# Patient Record
Sex: FEMALE | Race: BLACK OR AFRICAN AMERICAN | NOT HISPANIC OR LATINO | URBAN - METROPOLITAN AREA
[De-identification: names, ages, dates, MRNs, and addresses within clinical notes are randomized per-mention and may not be internally consistent; named-entity substitution may affect disease eponyms.]

---

## 2018-04-29 ENCOUNTER — INPATIENT (INPATIENT)
Facility: HOSPITAL | Age: 68
LOS: 3 days | Discharge: HOME HEALTH | End: 2018-05-03
Attending: INTERNAL MEDICINE | Admitting: INTERNAL MEDICINE
Payer: MEDICARE

## 2018-04-29 VITALS
SYSTOLIC BLOOD PRESSURE: 195 MMHG | OXYGEN SATURATION: 100 % | RESPIRATION RATE: 18 BRPM | DIASTOLIC BLOOD PRESSURE: 92 MMHG | HEART RATE: 100 BPM | TEMPERATURE: 99 F

## 2018-04-29 RX ORDER — OXYCODONE HYDROCHLORIDE 5 MG/1
5 TABLET ORAL ONCE
Qty: 0 | Refills: 0 | Status: DISCONTINUED | OUTPATIENT
Start: 2018-04-29 | End: 2018-04-29

## 2018-04-29 RX ORDER — ACETAMINOPHEN 500 MG
650 TABLET ORAL ONCE
Qty: 0 | Refills: 0 | Status: COMPLETED | OUTPATIENT
Start: 2018-04-29 | End: 2018-04-29

## 2018-04-29 RX ORDER — IBUPROFEN 200 MG
600 TABLET ORAL ONCE
Qty: 0 | Refills: 0 | Status: COMPLETED | OUTPATIENT
Start: 2018-04-29 | End: 2018-04-29

## 2018-04-29 RX ADMIN — Medication 650 MILLIGRAM(S): at 22:08

## 2018-04-29 RX ADMIN — Medication 600 MILLIGRAM(S): at 23:08

## 2018-04-29 RX ADMIN — Medication 600 MILLIGRAM(S): at 22:08

## 2018-04-29 NOTE — ED PROVIDER NOTE - PROGRESS NOTE DETAILS
[place]: Pt signed out to me.  59F s/p mechanical fall, now with L hamstring pain.  Pending XR L femur and hip. [place]: Imaging neg for fx or dislocation but patient still with significant hamstring pain.  Unable to ambulate.  Will give additional pain medication and reeval. [place]: Pt still unable to ambulate.  Will obtain CT lumbar spine and L hip. [place]: Spoke with radiology, who does not see a fracture but does see inflammation around the origin of the hamstring.  Pt still cannot ambulate.  Will admit for pain control and PT.

## 2018-04-29 NOTE — ED ADULT NURSE NOTE - OBJECTIVE STATEMENT
Alert and oriented x 4. Pt received to spot 13 due to slip and fall. Pt states : " I was trying to avoid a puddle and fell on my butt." Pt complaining of pain to left leg and butt. Positive pulse, motor and sensory to left extremity. Pt denies chest pain, shortness of breath, nausea, vomiting or dizziness. No painful urination or diarrhea. Family at bedside. VSS. Will continue to monitor. RN Facilitator.

## 2018-04-29 NOTE — ED PROVIDER NOTE - ATTENDING CONTRIBUTION TO CARE
I was physically present for the E/M service provided. I agree with above history, physical, and plan which I have reviewed and edited where appropriate. I was physically present for the key portions of the service provided.    67 yr old female with hx of HTn not on ac presents to ed with mechanical fall onto left buttock while attempting to avoid a puddle outside her house.  pt denies any preceding sx.  slipped on dirt and landed on left buttock, no head trauma, no loc, able to get up but in severe and spastic pain at left hamstring.  no numbness or tingling.    *GEN:   uncomfortable, in mild apparent distress, AOx3  *CV:   regular rate and rhythm, normal S1/S2, no murmur  *RESP:   clear to auscultation bilaterally, non-labored, speaking in full sentences  *ABD:   soft, non tender, no guarding  *:   no cva tenderness  *MSK:   severe spasm of left hamstrings, moving all extremity, no obvious deformity or hematoma  *SKIN:   dry, intact, no rash  *NEURO:   AOx3, no focal weakness or loss of sensation,  GCS 15    pt with mechanical fall now having severe spasms will give heat packs, valium, and obtain xr to r/o fractures (even though very unlikely) more concern for partial tear of muscle vs severe contusion.

## 2018-04-29 NOTE — ED PROVIDER NOTE - OBJECTIVE STATEMENT
68 yo F with htn presenting after mechanical fall while walking home. Pt slipped and fell on her left buttocks. complains of severe pain on the left gluteal muscle. Pain is worse with movements and tenderness. Denies fevers, chills, cough, rhinorrhea, otorrhea, otalgia, nausea, vomiting, constipation, diarrhea, chest pain, shortness of breath or changes in urinary habits.

## 2018-04-29 NOTE — ED ADULT TRIAGE NOTE - CHIEF COMPLAINT QUOTE
Brought in by DANA from home for mechanical fall.  Patient was walking down some stairs and there was a puddle of water she tried to step around and fall.  Denies dizziness or weakness.  No LOC or trauma to head.  Complaining of pain to left gluteal and left leg.  Patient unable to extend left leg.

## 2018-04-30 DIAGNOSIS — Z29.9 ENCOUNTER FOR PROPHYLACTIC MEASURES, UNSPECIFIED: ICD-10-CM

## 2018-04-30 DIAGNOSIS — M79.605 PAIN IN LEFT LEG: ICD-10-CM

## 2018-04-30 DIAGNOSIS — S76.319A STRAIN OF MUSCLE, FASCIA AND TENDON OF THE POSTERIOR MUSCLE GROUP AT THIGH LEVEL, UNSPECIFIED THIGH, INITIAL ENCOUNTER: ICD-10-CM

## 2018-04-30 DIAGNOSIS — I10 ESSENTIAL (PRIMARY) HYPERTENSION: ICD-10-CM

## 2018-04-30 DIAGNOSIS — I45.6 PRE-EXCITATION SYNDROME: ICD-10-CM

## 2018-04-30 LAB
ALBUMIN SERPL ELPH-MCNC: 3.7 G/DL — SIGNIFICANT CHANGE UP (ref 3.3–5)
ALP SERPL-CCNC: 62 U/L — SIGNIFICANT CHANGE UP (ref 40–120)
ALT FLD-CCNC: 13 U/L — SIGNIFICANT CHANGE UP (ref 4–33)
AST SERPL-CCNC: 19 U/L — SIGNIFICANT CHANGE UP (ref 4–32)
BASOPHILS # BLD AUTO: 0.05 K/UL — SIGNIFICANT CHANGE UP (ref 0–0.2)
BASOPHILS NFR BLD AUTO: 0.4 % — SIGNIFICANT CHANGE UP (ref 0–2)
BILIRUB SERPL-MCNC: 0.9 MG/DL — SIGNIFICANT CHANGE UP (ref 0.2–1.2)
BUN SERPL-MCNC: 14 MG/DL — SIGNIFICANT CHANGE UP (ref 7–23)
CALCIUM SERPL-MCNC: 8.4 MG/DL — SIGNIFICANT CHANGE UP (ref 8.4–10.5)
CHLORIDE SERPL-SCNC: 98 MMOL/L — SIGNIFICANT CHANGE UP (ref 98–107)
CK SERPL-CCNC: 332 U/L — HIGH (ref 25–170)
CO2 SERPL-SCNC: 27 MMOL/L — SIGNIFICANT CHANGE UP (ref 22–31)
CREAT SERPL-MCNC: 1.17 MG/DL — SIGNIFICANT CHANGE UP (ref 0.5–1.3)
EOSINOPHIL # BLD AUTO: 0.01 K/UL — SIGNIFICANT CHANGE UP (ref 0–0.5)
EOSINOPHIL NFR BLD AUTO: 0.1 % — SIGNIFICANT CHANGE UP (ref 0–6)
GLUCOSE SERPL-MCNC: 115 MG/DL — HIGH (ref 70–99)
HCT VFR BLD CALC: 35.7 % — SIGNIFICANT CHANGE UP (ref 34.5–45)
HGB BLD-MCNC: 11.4 G/DL — LOW (ref 11.5–15.5)
IMM GRANULOCYTES # BLD AUTO: 0.04 # — SIGNIFICANT CHANGE UP
IMM GRANULOCYTES NFR BLD AUTO: 0.3 % — SIGNIFICANT CHANGE UP (ref 0–1.5)
LYMPHOCYTES # BLD AUTO: 15.9 % — SIGNIFICANT CHANGE UP (ref 13–44)
LYMPHOCYTES # BLD AUTO: 2.08 K/UL — SIGNIFICANT CHANGE UP (ref 1–3.3)
MAGNESIUM SERPL-MCNC: 1.9 MG/DL — SIGNIFICANT CHANGE UP (ref 1.6–2.6)
MCHC RBC-ENTMCNC: 29.4 PG — SIGNIFICANT CHANGE UP (ref 27–34)
MCHC RBC-ENTMCNC: 31.9 % — LOW (ref 32–36)
MCV RBC AUTO: 92 FL — SIGNIFICANT CHANGE UP (ref 80–100)
MONOCYTES # BLD AUTO: 1.2 K/UL — HIGH (ref 0–0.9)
MONOCYTES NFR BLD AUTO: 9.2 % — SIGNIFICANT CHANGE UP (ref 2–14)
NEUTROPHILS # BLD AUTO: 9.72 K/UL — HIGH (ref 1.8–7.4)
NEUTROPHILS NFR BLD AUTO: 74.1 % — SIGNIFICANT CHANGE UP (ref 43–77)
NRBC # FLD: 0 — SIGNIFICANT CHANGE UP
PHOSPHATE SERPL-MCNC: 2.8 MG/DL — SIGNIFICANT CHANGE UP (ref 2.5–4.5)
PLATELET # BLD AUTO: 360 K/UL — SIGNIFICANT CHANGE UP (ref 150–400)
PMV BLD: 11.3 FL — SIGNIFICANT CHANGE UP (ref 7–13)
POTASSIUM SERPL-MCNC: 3.2 MMOL/L — LOW (ref 3.5–5.3)
POTASSIUM SERPL-SCNC: 3.2 MMOL/L — LOW (ref 3.5–5.3)
PROT SERPL-MCNC: 7.2 G/DL — SIGNIFICANT CHANGE UP (ref 6–8.3)
RBC # BLD: 3.88 M/UL — SIGNIFICANT CHANGE UP (ref 3.8–5.2)
RBC # FLD: 13.3 % — SIGNIFICANT CHANGE UP (ref 10.3–14.5)
SODIUM SERPL-SCNC: 138 MMOL/L — SIGNIFICANT CHANGE UP (ref 135–145)
WBC # BLD: 13.1 K/UL — HIGH (ref 3.8–10.5)
WBC # FLD AUTO: 13.1 K/UL — HIGH (ref 3.8–10.5)

## 2018-04-30 PROCEDURE — 72131 CT LUMBAR SPINE W/O DYE: CPT | Mod: 26

## 2018-04-30 PROCEDURE — 73501 X-RAY EXAM HIP UNI 1 VIEW: CPT | Mod: 26,LT

## 2018-04-30 PROCEDURE — 73700 CT LOWER EXTREMITY W/O DYE: CPT | Mod: 26,LT

## 2018-04-30 PROCEDURE — 93010 ELECTROCARDIOGRAM REPORT: CPT

## 2018-04-30 PROCEDURE — 99223 1ST HOSP IP/OBS HIGH 75: CPT

## 2018-04-30 RX ORDER — OXYCODONE AND ACETAMINOPHEN 5; 325 MG/1; MG/1
1 TABLET ORAL EVERY 6 HOURS
Qty: 0 | Refills: 0 | Status: DISCONTINUED | OUTPATIENT
Start: 2018-04-30 | End: 2018-05-03

## 2018-04-30 RX ORDER — MORPHINE SULFATE 50 MG/1
4 CAPSULE, EXTENDED RELEASE ORAL ONCE
Qty: 0 | Refills: 0 | Status: DISCONTINUED | OUTPATIENT
Start: 2018-04-30 | End: 2018-04-30

## 2018-04-30 RX ORDER — HEPARIN SODIUM 5000 [USP'U]/ML
5000 INJECTION INTRAVENOUS; SUBCUTANEOUS EVERY 12 HOURS
Qty: 0 | Refills: 0 | Status: DISCONTINUED | OUTPATIENT
Start: 2018-04-30 | End: 2018-05-03

## 2018-04-30 RX ORDER — POTASSIUM CHLORIDE 20 MEQ
40 PACKET (EA) ORAL ONCE
Qty: 0 | Refills: 0 | Status: COMPLETED | OUTPATIENT
Start: 2018-04-30 | End: 2018-04-30

## 2018-04-30 RX ORDER — ONDANSETRON 8 MG/1
4 TABLET, FILM COATED ORAL EVERY 6 HOURS
Qty: 0 | Refills: 0 | Status: DISCONTINUED | OUTPATIENT
Start: 2018-04-30 | End: 2018-05-03

## 2018-04-30 RX ORDER — HYDROCHLOROTHIAZIDE 25 MG
25 TABLET ORAL DAILY
Qty: 0 | Refills: 0 | Status: DISCONTINUED | OUTPATIENT
Start: 2018-04-30 | End: 2018-05-03

## 2018-04-30 RX ORDER — LOSARTAN POTASSIUM 100 MG/1
100 TABLET, FILM COATED ORAL DAILY
Qty: 0 | Refills: 0 | Status: DISCONTINUED | OUTPATIENT
Start: 2018-04-30 | End: 2018-05-03

## 2018-04-30 RX ORDER — OXYCODONE AND ACETAMINOPHEN 5; 325 MG/1; MG/1
2 TABLET ORAL EVERY 6 HOURS
Qty: 0 | Refills: 0 | Status: DISCONTINUED | OUTPATIENT
Start: 2018-04-30 | End: 2018-05-03

## 2018-04-30 RX ORDER — METOPROLOL TARTRATE 50 MG
200 TABLET ORAL DAILY
Qty: 0 | Refills: 0 | Status: DISCONTINUED | OUTPATIENT
Start: 2018-04-30 | End: 2018-05-03

## 2018-04-30 RX ORDER — METOPROLOL TARTRATE 50 MG
1 TABLET ORAL
Qty: 0 | Refills: 0 | COMMUNITY

## 2018-04-30 RX ORDER — POLYETHYLENE GLYCOL 3350 17 G/17G
17 POWDER, FOR SOLUTION ORAL DAILY
Qty: 0 | Refills: 0 | Status: DISCONTINUED | OUTPATIENT
Start: 2018-04-30 | End: 2018-05-03

## 2018-04-30 RX ORDER — OXYCODONE HYDROCHLORIDE 5 MG/1
10 TABLET ORAL ONCE
Qty: 0 | Refills: 0 | Status: DISCONTINUED | OUTPATIENT
Start: 2018-04-30 | End: 2018-04-30

## 2018-04-30 RX ORDER — LOSARTAN/HYDROCHLOROTHIAZIDE 100MG-25MG
1 TABLET ORAL
Qty: 0 | Refills: 0 | COMMUNITY

## 2018-04-30 RX ADMIN — Medication 25 MILLIGRAM(S): at 11:03

## 2018-04-30 RX ADMIN — MORPHINE SULFATE 4 MILLIGRAM(S): 50 CAPSULE, EXTENDED RELEASE ORAL at 03:10

## 2018-04-30 RX ADMIN — HEPARIN SODIUM 5000 UNIT(S): 5000 INJECTION INTRAVENOUS; SUBCUTANEOUS at 17:00

## 2018-04-30 RX ADMIN — OXYCODONE AND ACETAMINOPHEN 2 TABLET(S): 5; 325 TABLET ORAL at 20:28

## 2018-04-30 RX ADMIN — Medication 200 MILLIGRAM(S): at 11:04

## 2018-04-30 RX ADMIN — OXYCODONE HYDROCHLORIDE 5 MILLIGRAM(S): 5 TABLET ORAL at 01:00

## 2018-04-30 RX ADMIN — MORPHINE SULFATE 4 MILLIGRAM(S): 50 CAPSULE, EXTENDED RELEASE ORAL at 02:46

## 2018-04-30 RX ADMIN — LOSARTAN POTASSIUM 100 MILLIGRAM(S): 100 TABLET, FILM COATED ORAL at 11:04

## 2018-04-30 RX ADMIN — OXYCODONE HYDROCHLORIDE 10 MILLIGRAM(S): 5 TABLET ORAL at 01:56

## 2018-04-30 RX ADMIN — OXYCODONE AND ACETAMINOPHEN 2 TABLET(S): 5; 325 TABLET ORAL at 21:25

## 2018-04-30 RX ADMIN — OXYCODONE HYDROCHLORIDE 5 MILLIGRAM(S): 5 TABLET ORAL at 00:00

## 2018-04-30 RX ADMIN — OXYCODONE HYDROCHLORIDE 10 MILLIGRAM(S): 5 TABLET ORAL at 03:00

## 2018-04-30 RX ADMIN — Medication 40 MILLIEQUIVALENT(S): at 11:04

## 2018-04-30 NOTE — DISCHARGE NOTE ADULT - PATIENT PORTAL LINK FT
You can access the [email or web address] Patient Portal, offered by [facility], by registering with the following website: [email or web address]

## 2018-04-30 NOTE — CHART NOTE - NSCHARTNOTEFT_GEN_A_CORE
Ortho called to consult for suspected high-grade partial tear of the left proximal hamstring tendon with retraction and surrounding hemorrhage seen on CT scan - As per ortho, no acute surgical intervention required as inpatient, no further imaging recommended, no physical restrictions, Should follow up with Dr. Cam as outpatient ([phone]).  Continue with pain control, PT eval ordered, F/U recs---

## 2018-04-30 NOTE — DISCHARGE NOTE ADULT - CARE PLAN
Principal Discharge DX:	Left leg pain  Assessment and plan of treatment:	Follow up with ortho as outpatient - Dr. Cam [phone] Principal Discharge DX:	Left leg pain  Goal:	stable  Assessment and plan of treatment:	Follow up with ortho as outpatient - Dr. Cam [phone]  -CT left leg shows high-grade partial tear of proximal hamstring tendon  -PT eval: Home with outpatient PT, wheelchair and rolling walker prescribed  -Cont. with pain management as prescribed   -fall precautions  -Follow up with PCP within 1 week of being discharged from hospital  Secondary Diagnosis:	Essential hypertension  Goal:	stable  Assessment and plan of treatment:	Continue current blood pressure medication regimen as directed. Monitor for any visual changes, headaches or dizziness.  Monitor blood pressure regularly, hold if SBP <110 or HR <60.  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 C/w Metoprolol as previously prescribed. Hold metoprolol if SBP <110 or HR <60  Secondary Diagnosis:	Hypokalemia  Goal:	resolved  Assessment and plan of treatment:	-received supplementation, potassium now WNL (4.1 on 5/2)  -Please follow up with PCP within 1 week of discharge from hospital Principal Discharge DX:	Left leg pain  Goal:	Pain control, PT - home with home PT  Assessment and plan of treatment:	CT left leg shows high-grade partial tear of proximal hamstring tendon. Follow up with Orthopedics as outpatient at Dr. Cam. Call [phone] to make an appointment. Continue with home physical therapy sessions as instructed to help with improvement in activity of daily living.    -PT eval: Home with outpatient PT, wheelchair and rolling walker prescribed  -Cont. with pain management as prescribed   -fall precautions  -Follow up with PCP within 1 week of being discharged from hospital  Secondary Diagnosis:	Essential hypertension  Goal:	Stable  Assessment and plan of treatment:	Continue current blood pressure medication regimen as directed. Monitor for any visual changes, headaches or dizziness.  Monitor blood pressure regularly, hold if SBP <110 or HR <60.  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 Continue blood pressure medication regimen as directed. Monitor for any visual changes, headaches or dizziness.  Monitor blood pressure regularly.  Follow up with your PCP for further management for high blood pressure.  Secondary Diagnosis:	Hypokalemia  Goal:	Resolved  Assessment and plan of treatment:	You were supplemented. Stable. Please follow up with PCP within 1 week of discharge from hospital with repeat BMP to monitor levels. Principal Discharge DX:	Left leg pain  Goal:	Pain control, PT - home with home PT  Assessment and plan of treatment:	CT left leg shows high-grade partial tear of proximal hamstring tendon. Follow up with Orthopedics as outpatient at Dr. Cam. Call [phone] to make an appointment. Continue with home physical therapy sessions as instructed to help with improvement in activity of daily living.  Secondary Diagnosis:	Essential hypertension  Goal:	Stable  Assessment and plan of treatment:	Continue current blood pressure medication regimen as directed. Monitor for any visual changes, headaches or dizziness.  Monitor blood pressure regularly, hold if SBP <110 or HR <60.  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 Continue blood pressure medication regimen as directed. Monitor for any visual changes, headaches or dizziness.  Monitor blood pressure regularly.  Follow up with your PCP for further management for high blood pressure.  Secondary Diagnosis:	Hypokalemia  Goal:	Resolved  Assessment and plan of treatment:	You were supplemented. Stable. Please follow up with PCP within 1 week of discharge from hospital with repeat BMP to monitor levels.

## 2018-04-30 NOTE — H&P ADULT - HISTORY OF PRESENT ILLNESS
67F hx of HTN presents to [facility] ED s/p mechanical fall.     In the ED patient given Valium 2 mg IV x 2, Ibuprofen 600 mg PO x 1, Tylenol 650 mg PO x 1, Morphine 4 mg IV x 1, Oxycodone 10 mg PO x 1, Oxycodone 5 mg PO x 1. 67F hx of HTN presents to [facility] ED s/p mechanical fall. Patient in [place] until yesterday when she was walking outside and slipped and fell on her left buttock. She remembers the entire event and denies loss of consciousness, loss of bladder/bowel control, tongue biting, or confusion. She had severe pain on the back of her left thigh and buttock after she fell. Patient was unable to walk after the fall and had her son bring her to the ER.     In the ED patient given Valium 2 mg IV x 2, Ibuprofen 600 mg PO x 1, Tylenol 650 mg PO x 1, Morphine 4 mg IV x 1, Oxycodone 10 mg PO x 1, Oxycodone 5 mg PO x 1.

## 2018-04-30 NOTE — DISCHARGE NOTE ADULT - HOSPITAL COURSE
67F hx of HTN presents to [facility] ED s/p mechanical fall with left leg pain. Hip x-ray negative for fracture. 67F PMHx of HTN presents to [facility] ED S/P mechanical fall. Pt was walking outside and fell on her L buttock.     Left leg pain.    -CT LE - negative for lumbar fracture, suspected high-grade partial tear of the left proximal hamstring tendon with retraction and surrounding hemorrhage  -Ortho Cx - no acute intervention  -Pain control   -PT - home with home PT/wheelchair/walker   -Stable, F/U outpatient Dr. Cam at [phone]    Essential hypertension.    -HCTZ, Losartan  -Stable, F/U outpatient PCP     Short [place]-normal QRS complex syndrome.    -EKG - paroxysmal SVT.   -Metoprolol  -Stable, F/U outpatient PCP     Discharge to home with home PT

## 2018-04-30 NOTE — DISCHARGE NOTE ADULT - PLAN OF CARE
Follow up with ortho as outpatient - Dr. Cam [phone] stable Follow up with ortho as outpatient - Dr. Cam [phone]  -CT left leg shows high-grade partial tear of proximal hamstring tendon  -PT eval: Home with outpatient PT, wheelchair and rolling walker prescribed  -Cont. with pain management as prescribed   -fall precautions  -Follow up with PCP within 1 week of being discharged from hospital Continue current blood pressure medication regimen as directed. Monitor for any visual changes, headaches or dizziness.  Monitor blood pressure regularly, hold if SBP <110 or HR <60.  Follow up with your PCP for further management for high blood pressure, please call to make appointment within 1 week of discharge - EKG pattern a/w paroxysmal SVT. C/w Metoprolol as previously prescribed. Hold metoprolol if SBP <110 or HR <60 resolved -received supplementation, potassium now WNL (4.1 on 5/2)  -Please follow up with PCP within 1 week of discharge from hospital Pain control, PT - home with home PT CT left leg shows high-grade partial tear of proximal hamstring tendon. Follow up with Orthopedics as outpatient at Dr. Cam. Call [phone] to make an appointment. Continue with home physical therapy sessions as instructed to help with improvement in activity of daily living.    -PT eval: Home with outpatient PT, wheelchair and rolling walker prescribed  -Cont. with pain management as prescribed   -fall precautions  -Follow up with PCP within 1 week of being discharged from hospital Stable EKG pattern showed paroxysmal SVT. Continue blood pressure medication regimen as directed. Monitor for any visual changes, headaches or dizziness.  Monitor blood pressure regularly.  Follow up with your PCP for further management for high blood pressure. Resolved You were supplemented. Stable. Please follow up with PCP within 1 week of discharge from hospital with repeat BMP to monitor levels. CT left leg shows high-grade partial tear of proximal hamstring tendon. Follow up with Orthopedics as outpatient at Dr. Cam. Call [phone] to make an appointment. Continue with home physical therapy sessions as instructed to help with improvement in activity of daily living.

## 2018-04-30 NOTE — H&P ADULT - PROBLEM SELECTOR PLAN 3
EKG pattern a/w paroxysmal SVT. C/w Metoprolol as previously prescribed Has hx of palpitations, was told of EKG finding. C/w Metoprolol as previously prescribed

## 2018-04-30 NOTE — H&P ADULT - NSHPPHYSICALEXAM_GEN_ALL_CORE
Vital Signs Last 24 Hrs  T(C): 36.7 (30 Apr 2018 07:06), Max: 37.2 (29 Apr 2018 20:58)  T(F): 98.1 (30 Apr 2018 07:06), Max: 98.9 (29 Apr 2018 20:58)  HR: 58 (30 Apr 2018 07:06) (58 - 100)  BP: 142/68 (30 Apr 2018 07:06) (138/78 - 195/92)  BP(mean): --  RR: 15 (30 Apr 2018 07:06) (15 - 18)  SpO2: 100% (30 Apr 2018 07:06) (100% - 100%)    General: NAD, non-toxic appearing, speaking in full sentences   HEENT: EOMI, PERRLA, no conjunctival pallor, MMM, no JVD, no thyromegaly, neck supple, trachea midline  CV: S1S2 RRR no MRG  Lungs: CTA BL  Abdomen: soft NTND +BS   Extremities: No CCE +WWP  Skin/MSK: No rashes, preserved ROM on active & passive movement  Neuro: AAOx3, no focal deficits (5/5 strength all extremities), no sensory deficits Vital Signs Last 24 Hrs  T(C): 36.7 (30 Apr 2018 07:06), Max: 37.2 (29 Apr 2018 20:58)  T(F): 98.1 (30 Apr 2018 07:06), Max: 98.9 (29 Apr 2018 20:58)  HR: 58 (30 Apr 2018 07:06) (58 - 100)  BP: 142/68 (30 Apr 2018 07:06) (138/78 - 195/92)  BP(mean): --  RR: 15 (30 Apr 2018 07:06) (15 - 18)  SpO2: 100% (30 Apr 2018 07:06) (100% - 100%)    General: NAD, non-toxic appearing, speaking in full sentences   HEENT: EOMI, PERRLA, no conjunctival pallor, MMM, no JVD, no thyromegaly, neck supple, trachea midline  CV: S1S2 RRR no MRG  Lungs: CTA BL  Abdomen: soft NTND +BS   Extremities: No CCE +WWP  Skin/MSK: No rashes, preserved ROM on active & passive movement of RLE. Her LLE has decreased range of motion at full flexion of her knee and hip flexion due to severe pain located on her left hamstrings.   Neuro: AAOx3, no focal deficits (5/5 strength all extremities), no sensory deficits

## 2018-04-30 NOTE — H&P ADULT - ASSESSMENT
67F hx of HTN presents to [facility] ED s/p mechanical fall being admitted for pain control. 67F hx of HTN presents to [facility] ED s/p mechanical fall being admitted for pain control for left hamstring tear.

## 2018-04-30 NOTE — DISCHARGE NOTE ADULT - MEDICATION SUMMARY - MEDICATIONS TO TAKE
I will START or STAY ON the medications listed below when I get home from the hospital:    losartan-hydroCHLOROthiazide 100 mg-25 mg oral tablet  -- 1 tab(s) by mouth once a day  -- Indication: For Hypertention    metoprolol succinate 200 mg oral tablet, extended release  -- 1 tab(s) by mouth once a day  -- Indication: For Hypertension

## 2018-04-30 NOTE — DISCHARGE NOTE ADULT - ADDITIONAL INSTRUCTIONS
Please follow up with outpatient orthopedics Dr. Cam [phone], follow up with your primary care physician within 1 week of being discharged from hospital     -follow up with physical therapy outpatient

## 2018-04-30 NOTE — H&P ADULT - NSHPLABSRESULTS_GEN_ALL_CORE
CBC Full  -  ( 30 Apr 2018 02:40 )  WBC Count : 13.10 K/uL  Hemoglobin : 11.4 g/dL  Hematocrit : 35.7 %  Platelet Count - Automated : 360 K/uL  Mean Cell Volume : 92.0 fL  Mean Cell Hemoglobin : 29.4 pg  Mean Cell Hemoglobin Concentration : 31.9 %  Auto Neutrophil # : 9.72 K/uL  Auto Lymphocyte # : 2.08 K/uL  Auto Monocyte # : 1.20 K/uL  Auto Eosinophil # : 0.01 K/uL  Auto Basophil # : 0.05 K/uL  Auto Neutrophil % : 74.1 %  Auto Lymphocyte % : 15.9 %  Auto Monocyte % : 9.2 %  Auto Eosinophil % : 0.1 %  Auto Basophil % : 0.4 %    04-30    138  |  98  |  14  ----------------------------<  115<H>  3.2<L>   |  27  |  1.17    Ca    8.4      30 Apr 2018 02:40  Phos  2.8     04-30  Mg     1.9     04-30    TPro  7.2  /  Alb  3.7  /  TBili  0.9  /  DBili  x   /  AST  19  /  ALT  13  /  AlkPhos  62  04-30    < from: CT Lumbar Spine No Cont (04.30.18 @ 04:35) >  IMPRESSION:    No acute fracture or subluxation. Trace retrolisthesis of L5 on S1.  < end of copied text > CBC Full  -  ( 30 Apr 2018 02:40 )  WBC Count : 13.10 K/uL  Hemoglobin : 11.4 g/dL  Hematocrit : 35.7 %  Platelet Count - Automated : 360 K/uL  Mean Cell Volume : 92.0 fL  Mean Cell Hemoglobin : 29.4 pg  Mean Cell Hemoglobin Concentration : 31.9 %  Auto Neutrophil # : 9.72 K/uL  Auto Lymphocyte # : 2.08 K/uL  Auto Monocyte # : 1.20 K/uL  Auto Eosinophil # : 0.01 K/uL  Auto Basophil # : 0.05 K/uL  Auto Neutrophil % : 74.1 %  Auto Lymphocyte % : 15.9 %  Auto Monocyte % : 9.2 %  Auto Eosinophil % : 0.1 %  Auto Basophil % : 0.4 %    04-30    138  |  98  |  14  ----------------------------<  115<H>  3.2<L>   |  27  |  1.17    Ca    8.4      30 Apr 2018 02:40  Phos  2.8     04-30  Mg     1.9     04-30    TPro  7.2  /  Alb  3.7  /  TBili  0.9  /  DBili  x   /  AST  19  /  ALT  13  /  AlkPhos  62  04-30    < from: CT Lumbar Spine No Cont (04.30.18 @ 04:35) >  IMPRESSION:    No acute fracture or subluxation. Trace retrolisthesis of L5 on S1.  < end of copied text >    < from: CT Lower Extremity No Cont, Left (04.30.18 @ 04:38) >  IMPRESSION:  1.  Suspected high-grade partial tear of the left proximal hamstring   tendon with retraction and surrounding hemorrhage. MRI can be performed   for more detailed evaluation if is clinically warranted.  2.  No displaced fracture.  3.  Degenerative changes as above.  4.  Incompletely evaluated right ovarian calcifications. Pelvic sonogram   or dedicated pelvic MRI can be performed for more thorough evaluation.  < end of copied text >    < from: Xray Hip w/ Pelvis 1 View, Left (04.30.18 @ 00:06) >  IMPRESSION:  No acute displaced fracture.  < end of copied text >

## 2018-04-30 NOTE — PHYSICAL THERAPY INITIAL EVALUATION ADULT - RANGE OF MOTION EXAMINATION, REHAB EVAL
Except left Lower Extremity hip and knee motion: limited at end-range throughout./no ROM deficits were identified

## 2018-04-30 NOTE — H&P ADULT - PROBLEM SELECTOR PLAN 1
CT negative for lumbar fracture and awaiting Xray hip official read. C/w pain control for now and PT consult CT LLE (+) for tear of proximal hamstring. CT negative for lumbar fracture and Xray negative for hip fracture. Ortho called - recommending outpatient followup, no surgical intervention (see chart note). C/w pain control for now and PT consult

## 2018-04-30 NOTE — H&P ADULT - PROBLEM SELECTOR PLAN 4
IMPROVE VTE Individual Risk Assessment, Score = 2, c/w HSQ for DVT px           RISK                                                          Points  [  ] Previous VTE                                               3  [  ] Thrombophilia                                            2  [  ] Lower limb paresis/paralysis                    2    [  ] Active Cancer (in last 6 months)              2   [ x ] Immobilization > 24 hrs                             1  [  ] ICU/CCU stay > 24 hours                            1  [ x ] Age > 60                                                        1                                            Total Score _____2____

## 2018-05-01 LAB
ALBUMIN SERPL ELPH-MCNC: 4 G/DL — SIGNIFICANT CHANGE UP (ref 3.3–5)
ALP SERPL-CCNC: 67 U/L — SIGNIFICANT CHANGE UP (ref 40–120)
ALT FLD-CCNC: 15 U/L — SIGNIFICANT CHANGE UP (ref 4–33)
AST SERPL-CCNC: 31 U/L — SIGNIFICANT CHANGE UP (ref 4–32)
BASOPHILS # BLD AUTO: 0.04 K/UL — SIGNIFICANT CHANGE UP (ref 0–0.2)
BASOPHILS NFR BLD AUTO: 0.3 % — SIGNIFICANT CHANGE UP (ref 0–2)
BILIRUB SERPL-MCNC: 1.2 MG/DL — SIGNIFICANT CHANGE UP (ref 0.2–1.2)
BUN SERPL-MCNC: 10 MG/DL — SIGNIFICANT CHANGE UP (ref 7–23)
CALCIUM SERPL-MCNC: 9 MG/DL — SIGNIFICANT CHANGE UP (ref 8.4–10.5)
CHLORIDE SERPL-SCNC: 98 MMOL/L — SIGNIFICANT CHANGE UP (ref 98–107)
CO2 SERPL-SCNC: 30 MMOL/L — SIGNIFICANT CHANGE UP (ref 22–31)
CREAT SERPL-MCNC: 1.12 MG/DL — SIGNIFICANT CHANGE UP (ref 0.5–1.3)
EOSINOPHIL # BLD AUTO: 0.04 K/UL — SIGNIFICANT CHANGE UP (ref 0–0.5)
EOSINOPHIL NFR BLD AUTO: 0.3 % — SIGNIFICANT CHANGE UP (ref 0–6)
GLUCOSE SERPL-MCNC: 104 MG/DL — HIGH (ref 70–99)
HCT VFR BLD CALC: 38.4 % — SIGNIFICANT CHANGE UP (ref 34.5–45)
HGB BLD-MCNC: 12.6 G/DL — SIGNIFICANT CHANGE UP (ref 11.5–15.5)
IMM GRANULOCYTES # BLD AUTO: 0.05 # — SIGNIFICANT CHANGE UP
IMM GRANULOCYTES NFR BLD AUTO: 0.4 % — SIGNIFICANT CHANGE UP (ref 0–1.5)
LYMPHOCYTES # BLD AUTO: 18.2 % — SIGNIFICANT CHANGE UP (ref 13–44)
LYMPHOCYTES # BLD AUTO: 2.11 K/UL — SIGNIFICANT CHANGE UP (ref 1–3.3)
MAGNESIUM SERPL-MCNC: 1.9 MG/DL — SIGNIFICANT CHANGE UP (ref 1.6–2.6)
MCHC RBC-ENTMCNC: 29.2 PG — SIGNIFICANT CHANGE UP (ref 27–34)
MCHC RBC-ENTMCNC: 32.8 % — SIGNIFICANT CHANGE UP (ref 32–36)
MCV RBC AUTO: 89.1 FL — SIGNIFICANT CHANGE UP (ref 80–100)
MONOCYTES # BLD AUTO: 1.36 K/UL — HIGH (ref 0–0.9)
MONOCYTES NFR BLD AUTO: 11.7 % — SIGNIFICANT CHANGE UP (ref 2–14)
NEUTROPHILS # BLD AUTO: 8.01 K/UL — HIGH (ref 1.8–7.4)
NEUTROPHILS NFR BLD AUTO: 69.1 % — SIGNIFICANT CHANGE UP (ref 43–77)
NRBC # FLD: 0 — SIGNIFICANT CHANGE UP
PHOSPHATE SERPL-MCNC: 2.9 MG/DL — SIGNIFICANT CHANGE UP (ref 2.5–4.5)
PLATELET # BLD AUTO: 343 K/UL — SIGNIFICANT CHANGE UP (ref 150–400)
PMV BLD: 11.3 FL — SIGNIFICANT CHANGE UP (ref 7–13)
POTASSIUM SERPL-MCNC: 3.5 MMOL/L — SIGNIFICANT CHANGE UP (ref 3.5–5.3)
POTASSIUM SERPL-SCNC: 3.5 MMOL/L — SIGNIFICANT CHANGE UP (ref 3.5–5.3)
PROT SERPL-MCNC: 7.5 G/DL — SIGNIFICANT CHANGE UP (ref 6–8.3)
RBC # BLD: 4.31 M/UL — SIGNIFICANT CHANGE UP (ref 3.8–5.2)
RBC # FLD: 13.2 % — SIGNIFICANT CHANGE UP (ref 10.3–14.5)
SODIUM SERPL-SCNC: 141 MMOL/L — SIGNIFICANT CHANGE UP (ref 135–145)
WBC # BLD: 11.61 K/UL — HIGH (ref 3.8–10.5)
WBC # FLD AUTO: 11.61 K/UL — HIGH (ref 3.8–10.5)

## 2018-05-01 RX ADMIN — Medication 25 MILLIGRAM(S): at 05:39

## 2018-05-01 RX ADMIN — OXYCODONE AND ACETAMINOPHEN 2 TABLET(S): 5; 325 TABLET ORAL at 14:33

## 2018-05-01 RX ADMIN — LOSARTAN POTASSIUM 100 MILLIGRAM(S): 100 TABLET, FILM COATED ORAL at 05:39

## 2018-05-01 RX ADMIN — Medication 200 MILLIGRAM(S): at 05:39

## 2018-05-01 RX ADMIN — OXYCODONE AND ACETAMINOPHEN 2 TABLET(S): 5; 325 TABLET ORAL at 06:15

## 2018-05-01 RX ADMIN — OXYCODONE AND ACETAMINOPHEN 2 TABLET(S): 5; 325 TABLET ORAL at 21:41

## 2018-05-01 RX ADMIN — HEPARIN SODIUM 5000 UNIT(S): 5000 INJECTION INTRAVENOUS; SUBCUTANEOUS at 05:40

## 2018-05-01 RX ADMIN — OXYCODONE AND ACETAMINOPHEN 2 TABLET(S): 5; 325 TABLET ORAL at 05:38

## 2018-05-01 RX ADMIN — OXYCODONE AND ACETAMINOPHEN 2 TABLET(S): 5; 325 TABLET ORAL at 22:00

## 2018-05-01 RX ADMIN — OXYCODONE AND ACETAMINOPHEN 2 TABLET(S): 5; 325 TABLET ORAL at 14:03

## 2018-05-01 RX ADMIN — HEPARIN SODIUM 5000 UNIT(S): 5000 INJECTION INTRAVENOUS; SUBCUTANEOUS at 19:08

## 2018-05-01 NOTE — PROGRESS NOTE ADULT - ASSESSMENT
· Assessment	  67F hx of HTN presents to [facility] ED s/p mechanical fall being admitted for pain control for left hamstring tear.        Problem/Plan - 1:  ·  Problem: Hamstring tear. Plan: CT LLE (+) for tear of proximal hamstring. CT negative for lumbar fracture and Xray negative for hip fracture. Ortho called - recommending outpatient followup, no surgical intervention (see chart note). C/w pain control for now and PT consult.     Problem/Plan - 2:  ·  Problem: Essential hypertension.  Plan: Check BP q6h, c/w Hctz and Losartan as previously prescribed.      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  Plan: Has hx of palpitations, C/w Metoprolol as previously prescribed.

## 2018-05-01 NOTE — PROGRESS NOTE ADULT - SUBJECTIVE AND OBJECTIVE BOX
Patient is a 67y old  Female who presents with a chief complaint of mechanical fall (30 Apr 2018 14:07)      SUBJECTIVE / OVERNIGHT EVENTS:   Feels better.  Denies CP/SOB/Palpitation/HA.    MEDICATIONS  (STANDING):  heparin  Injectable 5000 Unit(s) SubCutaneous every 12 hours  hydrochlorothiazide 25 milliGRAM(s) Oral daily  losartan 100 milliGRAM(s) Oral daily  metoprolol succinate  milliGRAM(s) Oral daily    MEDICATIONS  (PRN):  ondansetron Injectable 4 milliGRAM(s) IV Push every 6 hours PRN Nausea and/or Vomiting  oxyCODONE    5 mG/acetaminophen 325 mG 1 Tablet(s) Oral every 6 hours PRN Moderate Pain (4 - 6)  oxyCODONE    5 mG/acetaminophen 325 mG 2 Tablet(s) Oral every 6 hours PRN Severe Pain (7 - 10)  polyethylene glycol 3350 17 Gram(s) Oral daily PRN Constipation        CAPILLARY BLOOD GLUCOSE        I&O's Summary    30 Apr 2018 07:01  -  01 May 2018 07:00  --------------------------------------------------------  IN: 240 mL / OUT: 0 mL / NET: 240 mL    01 May 2018 07:01  -  01 May 2018 23:00  --------------------------------------------------------  IN: 0 mL / OUT: 750 mL / NET: -750 mL        PHYSICAL EXAM:  GENERAL: NAD, well-developed  HEAD:  Atraumatic, Normocephalic  NECK: Supple, No JVD  CHEST/LUNG: Clear to auscultation bilaterally; No wheezing.  HEART: Regular rate and rhythm; No murmurs, rubs, or gallops  ABDOMEN: Soft, Nontender, Nondistended; Bowel sounds present  EXTREMITIES:   No clubbing, cyanosis, or edema  NEUROLOGY: AAO X 3  SKIN: No rashes    LABS:                        12.6   11.61 )-----------( 343      ( 01 May 2018 05:40 )             38.4     05-01    141  |  98  |  10  ----------------------------<  104<H>  3.5   |  30  |  1.12    Ca    9.0      01 May 2018 05:40  Phos  2.9     05-01  Mg     1.9     05-01    TPro  7.5  /  Alb  4.0  /  TBili  1.2  /  DBili  x   /  AST  31  /  ALT  15  /  AlkPhos  67  05-01      CARDIAC MARKERS ( 30 Apr 2018 02:40 )  x     / x     / 332 u/L / x     / x            CAPILLARY BLOOD GLUCOSE                    RADIOLOGY & ADDITIONAL TESTS:    Imaging Personally Reviewed:    Consultant(s) Notes Reviewed:      Care Discussed with Consultants/Other Providers:

## 2018-05-02 LAB
ALBUMIN SERPL ELPH-MCNC: 3.6 G/DL — SIGNIFICANT CHANGE UP (ref 3.3–5)
ALP SERPL-CCNC: 59 U/L — SIGNIFICANT CHANGE UP (ref 40–120)
ALT FLD-CCNC: 18 U/L — SIGNIFICANT CHANGE UP (ref 4–33)
AST SERPL-CCNC: 31 U/L — SIGNIFICANT CHANGE UP (ref 4–32)
BASOPHILS # BLD AUTO: 0.04 K/UL — SIGNIFICANT CHANGE UP (ref 0–0.2)
BASOPHILS NFR BLD AUTO: 0.4 % — SIGNIFICANT CHANGE UP (ref 0–2)
BILIRUB SERPL-MCNC: 0.9 MG/DL — SIGNIFICANT CHANGE UP (ref 0.2–1.2)
BUN SERPL-MCNC: 13 MG/DL — SIGNIFICANT CHANGE UP (ref 7–23)
BUN SERPL-MCNC: 17 MG/DL — SIGNIFICANT CHANGE UP (ref 7–23)
CALCIUM SERPL-MCNC: 8.9 MG/DL — SIGNIFICANT CHANGE UP (ref 8.4–10.5)
CALCIUM SERPL-MCNC: 9.4 MG/DL — SIGNIFICANT CHANGE UP (ref 8.4–10.5)
CHLORIDE SERPL-SCNC: 96 MMOL/L — LOW (ref 98–107)
CHLORIDE SERPL-SCNC: 97 MMOL/L — LOW (ref 98–107)
CO2 SERPL-SCNC: 31 MMOL/L — SIGNIFICANT CHANGE UP (ref 22–31)
CO2 SERPL-SCNC: 32 MMOL/L — HIGH (ref 22–31)
CREAT SERPL-MCNC: 1.19 MG/DL — SIGNIFICANT CHANGE UP (ref 0.5–1.3)
CREAT SERPL-MCNC: 1.27 MG/DL — SIGNIFICANT CHANGE UP (ref 0.5–1.3)
EOSINOPHIL # BLD AUTO: 0.03 K/UL — SIGNIFICANT CHANGE UP (ref 0–0.5)
EOSINOPHIL NFR BLD AUTO: 0.3 % — SIGNIFICANT CHANGE UP (ref 0–6)
GLUCOSE SERPL-MCNC: 119 MG/DL — HIGH (ref 70–99)
GLUCOSE SERPL-MCNC: 97 MG/DL — SIGNIFICANT CHANGE UP (ref 70–99)
HCT VFR BLD CALC: 36 % — SIGNIFICANT CHANGE UP (ref 34.5–45)
HGB BLD-MCNC: 11.9 G/DL — SIGNIFICANT CHANGE UP (ref 11.5–15.5)
IMM GRANULOCYTES # BLD AUTO: 0.07 # — SIGNIFICANT CHANGE UP
IMM GRANULOCYTES NFR BLD AUTO: 0.7 % — SIGNIFICANT CHANGE UP (ref 0–1.5)
LYMPHOCYTES # BLD AUTO: 1.72 K/UL — SIGNIFICANT CHANGE UP (ref 1–3.3)
LYMPHOCYTES # BLD AUTO: 18.4 % — SIGNIFICANT CHANGE UP (ref 13–44)
MAGNESIUM SERPL-MCNC: 1.9 MG/DL — SIGNIFICANT CHANGE UP (ref 1.6–2.6)
MCHC RBC-ENTMCNC: 29.2 PG — SIGNIFICANT CHANGE UP (ref 27–34)
MCHC RBC-ENTMCNC: 33.1 % — SIGNIFICANT CHANGE UP (ref 32–36)
MCV RBC AUTO: 88.5 FL — SIGNIFICANT CHANGE UP (ref 80–100)
MONOCYTES # BLD AUTO: 1.08 K/UL — HIGH (ref 0–0.9)
MONOCYTES NFR BLD AUTO: 11.6 % — SIGNIFICANT CHANGE UP (ref 2–14)
NEUTROPHILS # BLD AUTO: 6.4 K/UL — SIGNIFICANT CHANGE UP (ref 1.8–7.4)
NEUTROPHILS NFR BLD AUTO: 68.6 % — SIGNIFICANT CHANGE UP (ref 43–77)
NRBC # FLD: 0 — SIGNIFICANT CHANGE UP
PHOSPHATE SERPL-MCNC: 3.2 MG/DL — SIGNIFICANT CHANGE UP (ref 2.5–4.5)
PLATELET # BLD AUTO: 327 K/UL — SIGNIFICANT CHANGE UP (ref 150–400)
PMV BLD: 11.2 FL — SIGNIFICANT CHANGE UP (ref 7–13)
POTASSIUM SERPL-MCNC: 3.2 MMOL/L — LOW (ref 3.5–5.3)
POTASSIUM SERPL-MCNC: 4.1 MMOL/L — SIGNIFICANT CHANGE UP (ref 3.5–5.3)
POTASSIUM SERPL-SCNC: 3.2 MMOL/L — LOW (ref 3.5–5.3)
POTASSIUM SERPL-SCNC: 4.1 MMOL/L — SIGNIFICANT CHANGE UP (ref 3.5–5.3)
PROT SERPL-MCNC: 7.3 G/DL — SIGNIFICANT CHANGE UP (ref 6–8.3)
RBC # BLD: 4.07 M/UL — SIGNIFICANT CHANGE UP (ref 3.8–5.2)
RBC # FLD: 12.9 % — SIGNIFICANT CHANGE UP (ref 10.3–14.5)
SODIUM SERPL-SCNC: 139 MMOL/L — SIGNIFICANT CHANGE UP (ref 135–145)
SODIUM SERPL-SCNC: 139 MMOL/L — SIGNIFICANT CHANGE UP (ref 135–145)
WBC # BLD: 9.34 K/UL — SIGNIFICANT CHANGE UP (ref 3.8–10.5)
WBC # FLD AUTO: 9.34 K/UL — SIGNIFICANT CHANGE UP (ref 3.8–10.5)

## 2018-05-02 RX ORDER — POTASSIUM CHLORIDE 20 MEQ
40 PACKET (EA) ORAL ONCE
Qty: 0 | Refills: 0 | Status: DISCONTINUED | OUTPATIENT
Start: 2018-05-02 | End: 2018-05-02

## 2018-05-02 RX ORDER — POTASSIUM CHLORIDE 20 MEQ
40 PACKET (EA) ORAL ONCE
Qty: 0 | Refills: 0 | Status: COMPLETED | OUTPATIENT
Start: 2018-05-02 | End: 2018-05-02

## 2018-05-02 RX ORDER — POTASSIUM CHLORIDE 20 MEQ
40 PACKET (EA) ORAL EVERY 4 HOURS
Qty: 0 | Refills: 0 | Status: COMPLETED | OUTPATIENT
Start: 2018-05-02 | End: 2018-05-02

## 2018-05-02 RX ORDER — POTASSIUM CHLORIDE 20 MEQ
10 PACKET (EA) ORAL
Qty: 0 | Refills: 0 | Status: DISCONTINUED | OUTPATIENT
Start: 2018-05-02 | End: 2018-05-02

## 2018-05-02 RX ADMIN — Medication 40 MILLIEQUIVALENT(S): at 21:34

## 2018-05-02 RX ADMIN — HEPARIN SODIUM 5000 UNIT(S): 5000 INJECTION INTRAVENOUS; SUBCUTANEOUS at 05:43

## 2018-05-02 RX ADMIN — OXYCODONE AND ACETAMINOPHEN 2 TABLET(S): 5; 325 TABLET ORAL at 07:53

## 2018-05-02 RX ADMIN — Medication 40 MILLIEQUIVALENT(S): at 14:11

## 2018-05-02 RX ADMIN — Medication 200 MILLIGRAM(S): at 05:43

## 2018-05-02 RX ADMIN — LOSARTAN POTASSIUM 100 MILLIGRAM(S): 100 TABLET, FILM COATED ORAL at 05:43

## 2018-05-02 RX ADMIN — Medication 25 MILLIGRAM(S): at 05:43

## 2018-05-02 RX ADMIN — Medication 40 MILLIEQUIVALENT(S): at 08:37

## 2018-05-02 RX ADMIN — OXYCODONE AND ACETAMINOPHEN 2 TABLET(S): 5; 325 TABLET ORAL at 07:06

## 2018-05-02 NOTE — CHART NOTE - NSCHARTNOTEFT_GEN_A_CORE
Discussed with Dr. Esquivel- Pt medically cleared for discharge pending bmp (pt w/ low potassium this morning, supplemented). Will follow up.

## 2018-05-02 NOTE — PROGRESS NOTE ADULT - SUBJECTIVE AND OBJECTIVE BOX
Patient is a 67y old  Female who presents with a chief complaint of mechanical fall (30 Apr 2018 14:07)      SUBJECTIVE / OVERNIGHT EVENTS:   Feels better.  Denies CP/SOB/Palpitation/HA.    MEDICATIONS  (STANDING):  heparin  Injectable 5000 Unit(s) SubCutaneous every 12 hours  hydrochlorothiazide 25 milliGRAM(s) Oral daily  losartan 100 milliGRAM(s) Oral daily  metoprolol succinate  milliGRAM(s) Oral daily  potassium chloride    Tablet ER 40 milliEquivalent(s) Oral every 4 hours    MEDICATIONS  (PRN):  ondansetron Injectable 4 milliGRAM(s) IV Push every 6 hours PRN Nausea and/or Vomiting  oxyCODONE    5 mG/acetaminophen 325 mG 1 Tablet(s) Oral every 6 hours PRN Moderate Pain (4 - 6)  oxyCODONE    5 mG/acetaminophen 325 mG 2 Tablet(s) Oral every 6 hours PRN Severe Pain (7 - 10)  polyethylene glycol 3350 17 Gram(s) Oral daily PRN Constipation        CAPILLARY BLOOD GLUCOSE        I&O's Summary    01 May 2018 07:01  -  02 May 2018 07:00  --------------------------------------------------------  IN: 0 mL / OUT: 750 mL / NET: -750 mL        PHYSICAL EXAM:  GENERAL: NAD, well-developed  HEAD:  Atraumatic, Normocephalic  NECK: Supple, No JVD  CHEST/LUNG: Clear to auscultation bilaterally; No wheezing.  HEART: Regular rate and rhythm; No murmurs, rubs, or gallops  ABDOMEN: Soft, Nontender, Nondistended; Bowel sounds present  EXTREMITIES:   No clubbing, cyanosis, or edema  NEUROLOGY: AAO X 3  SKIN: No rashes    LABS:                        11.9   9.34  )-----------( 327      ( 02 May 2018 05:35 )             36.0     05-02    139  |  96<L>  |  17  ----------------------------<  119<H>  4.1   |  32<H>  |  1.27    Ca    9.4      02 May 2018 15:18  Phos  3.2     05-02  Mg     1.9     05-02    TPro  7.3  /  Alb  3.6  /  TBili  0.9  /  DBili  x   /  AST  31  /  ALT  18  /  AlkPhos  59  05-02            CAPILLARY BLOOD GLUCOSE                    RADIOLOGY & ADDITIONAL TESTS:    Imaging Personally Reviewed:    Consultant(s) Notes Reviewed:      Care Discussed with Consultants/Other Providers:

## 2018-05-02 NOTE — PROGRESS NOTE ADULT - ASSESSMENT
· Assessment	  67F hx of HTN presents to [facility] ED s/p mechanical fall being admitted for pain control for left hamstring tear.        Problem/Plan - 1:  ·  Problem: Hamstring tear. Plan: CT LLE (+) for tear of proximal hamstring. CT negative for lumbar fracture and Xray negative for hip fracture. Ortho called - recommending outpatient followup, no surgical intervention. C/w pain stable and PT eval noted.     Problem/Plan - 2:  ·  Problem: Essential hypertension.  Plan: Check BP q6h, c/w Hctz and Losartan as previously prescribed.      Problem/Plan - 3:  ·  Problem: Short [place]-Normal QRS Complex Syndrome.  Plan: Has hx of palpitations, C/w Metoprolol.

## 2018-05-03 VITALS
TEMPERATURE: 98 F | SYSTOLIC BLOOD PRESSURE: 129 MMHG | HEART RATE: 66 BPM | OXYGEN SATURATION: 100 % | DIASTOLIC BLOOD PRESSURE: 71 MMHG | RESPIRATION RATE: 18 BRPM

## 2018-05-03 RX ADMIN — Medication 200 MILLIGRAM(S): at 06:04

## 2018-05-03 RX ADMIN — Medication 25 MILLIGRAM(S): at 06:04

## 2018-05-03 RX ADMIN — HEPARIN SODIUM 5000 UNIT(S): 5000 INJECTION INTRAVENOUS; SUBCUTANEOUS at 06:04

## 2018-05-03 RX ADMIN — LOSARTAN POTASSIUM 100 MILLIGRAM(S): 100 TABLET, FILM COATED ORAL at 06:04

## 2019-09-23 NOTE — ED ADULT NURSE NOTE - TEMPERATURE IN FAHRENHEIT (DEGREES F)
78y Female with DM, HTN, CKD a/w diarrhea, anemia, and severe renal failure. required emergent HD overnight for severe metabolic acidosis and pulmonary edema. She is non-oliguric 98.9

## 2023-01-12 NOTE — PROGRESS NOTE ADULT - PROVIDER SPECIALTY LIST ADULT
Internal Medicine APPTS ARE READY TO BE MADE: [x] YES    Best Family or Patient Contact (if needed):    Additional Information about above appointments (if needed):    1:   2:   3:     Other comments or requests:

## 2024-03-11 NOTE — ED ADULT NURSE NOTE - BP NONINVASIVE SYSTOLIC (MM HG)
Continue Regimen: Clobetasol cream qd to affected areas of vtama doesn’t work \\nLexette foam to the scalp bid x 2 weeks on 1 week off \\nHydrocortisone cream 2.5% to the face bid x 2 weeks on 1 week off for flares
Plan: If pt is not doing better by her follow up she may consider initiating Skyrizi. Pt was given necessary lab order to begin
Discontinue Regimen: Otezla 30 mg bid due to depression
Detail Level: Zone
Initiate Treatment: Zoryve 0.3 % topical cream \\nSig: Apply qd to the aa\\nZoryve 0.3% topical foam to scalp QD
Render In Strict Bullet Format?: No
175